# Patient Record
Sex: MALE | Race: OTHER | HISPANIC OR LATINO
[De-identification: names, ages, dates, MRNs, and addresses within clinical notes are randomized per-mention and may not be internally consistent; named-entity substitution may affect disease eponyms.]

---

## 2021-09-27 PROBLEM — Z00.00 ENCOUNTER FOR PREVENTIVE HEALTH EXAMINATION: Status: ACTIVE | Noted: 2021-09-27

## 2021-09-30 ENCOUNTER — APPOINTMENT (OUTPATIENT)
Dept: VASCULAR SURGERY | Facility: CLINIC | Age: 41
End: 2021-09-30
Payer: COMMERCIAL

## 2021-09-30 PROCEDURE — 93971 EXTREMITY STUDY: CPT

## 2021-09-30 PROCEDURE — 99203 OFFICE O/P NEW LOW 30 MIN: CPT

## 2021-10-01 NOTE — PHYSICAL EXAM
[Respiratory Effort] : normal respiratory effort [Normal Heart Sounds] : normal heart sounds [2+] : left 2+ [Calm] : calm [Ankle Swelling (On Exam)] : not present [Varicose Veins Of Lower Extremities] : present [Varicose Veins Of The Right Leg] : of the right leg [Ankle Swelling On The Left] : moderate [] : not present [Alert] : alert [Oriented to Person] : oriented to person [Oriented to Place] : oriented to place [Oriented to Time] : oriented to time [de-identified] : well appearing [FreeTextEntry1] : large bulging veins in the posterior right calf.

## 2021-10-01 NOTE — END OF VISIT
[FreeTextEntry3] : I, Dr. Stephany Osborne  have read and attest that all the information, medical decision making and discharge instructions within are true and accurate, I  personally performed the evaluation and management (E/M) services for this new patient.  That E/M includes conducting the initial examination, assessing all conditions, and establishing the plan of care.  Today, my ACP, Yolis Minor PA-C, was here to observe my evaluation and management services for this patient to be followed going forward.\par

## 2021-10-01 NOTE — HISTORY OF PRESENT ILLNESS
[FreeTextEntry1] : 40 year old male without significant PMH presenting to the office for evaluation of his right leg varicose veins. He states they have been bothering him more lately, they feel heavy and achy. He is wearing compression stockings with some mild relief. He also notices some mild ankle swelling that has worsened lately and worsens after he works. 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

## 2021-10-01 NOTE — ASSESSMENT
[FreeTextEntry1] : 40 year old male without significant PMH presenting to the office for evaluation of his right leg varicose veins. He states they have been bothering him more lately, they feel heavy and achy. He is wearing compression stockings with some mild relief. He also notices some mild ankle swelling that has worsened lately and worsens after he works. \par \par Plan:\par - US showing right leg with GSV reflux, he is symptomatic, recommend right leg RFA procedure. Will work on insurance auth and call him when approved.

## 2021-10-13 ENCOUNTER — APPOINTMENT (OUTPATIENT)
Dept: VASCULAR SURGERY | Facility: CLINIC | Age: 41
End: 2021-10-13

## 2021-10-28 ENCOUNTER — APPOINTMENT (OUTPATIENT)
Dept: VASCULAR SURGERY | Facility: CLINIC | Age: 41
End: 2021-10-28

## 2022-01-12 PROBLEM — I83.891 SYMPTOMATIC VARICOSE VEINS OF RIGHT LOWER EXTREMITY: Status: ACTIVE | Noted: 2021-10-01

## 2022-01-13 ENCOUNTER — APPOINTMENT (OUTPATIENT)
Dept: VASCULAR SURGERY | Facility: CLINIC | Age: 42
End: 2022-01-13
Payer: COMMERCIAL

## 2022-01-13 DIAGNOSIS — I83.891 VARICOSE VEINS OF RIGHT LOWER EXTREMITY WITH OTHER COMPLICATIONS: ICD-10-CM

## 2022-01-13 PROCEDURE — 36475 ENDOVENOUS RF 1ST VEIN: CPT | Mod: RT

## 2022-01-18 ENCOUNTER — APPOINTMENT (OUTPATIENT)
Dept: VASCULAR SURGERY | Facility: CLINIC | Age: 42
End: 2022-01-18
Payer: COMMERCIAL

## 2022-01-18 PROCEDURE — 93971 EXTREMITY STUDY: CPT

## 2022-01-18 NOTE — PROCEDURE
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  \par \par DESCRIPTION OF PROCEDURE:  The patient was taken to the procedure room and placed on the procedure table in the supine position.  Patient identification and site location were confirmed.  Informed consent was obtained and a surgical time out was performed.  After the patient was prepped and draped in the usual sterile fashion, and placed in reverse-Trendelenberg position, local anesthesia was instilled in the skin overlying the access site.  An incision was made overlying the identified entry site with an 11-blade scalpel.  The vein was accessed using ultrasound guidance and with a micropuncture needle, a guide wire was introduced through the needle, which was then exchanged over the wire for a 7 [place] sheath.  The radiofrqeuency catheter was placed into the vein through the sheath just inferior to the superficial epigastric vein to preserve normal physiologic flow in that vein, which was confirmed via ultrasound.  The catheter tip was placed a minimum of 3 cm distal to the saphenofemoral junction.  Tumescent anesthesia was again confirmed with ultrasound imaging, and under direct external compression along the length of the heating element, radiofrequency energy was applied.  The vein was segmentally ablated by heating a 7 cm segment and then indexing the catheter out by 3.5 cm until the treatment length was completed.  \par \par Repeat ultrasound of the saphenous vein was performed, confirming successful treatment.  Catheter and sheath were withdrawn.  The skin incision over the puncture site was closed with a steri-strip and a compression wrap was applied from the level of the foot to the most proximal level of the treated segment.  The patient was assisted off the procedure table.  \par \par Patient will return in 2-3 days for repeat ultrasound to make sure GSV is closed and to check for heat induced thrombus.

## 2022-01-18 NOTE — END OF VISIT
[FreeTextEntry3] : I, Dr. Stephany Osborne  have read and attest that all the information, medical decision making and discharge instructions within are true and accurate. I personally performed the procedure for this established patient who presents today. That E/M includes conducting the examination, assessing all conditions, and performing this procedure myself. Today, my ACP, Yolis Minor PA-C, was here to observe the procedure.

## 2022-02-24 ENCOUNTER — APPOINTMENT (OUTPATIENT)
Dept: VASCULAR SURGERY | Facility: CLINIC | Age: 42
End: 2022-02-24
Payer: COMMERCIAL

## 2022-02-24 PROCEDURE — 99213 OFFICE O/P EST LOW 20 MIN: CPT

## 2022-02-26 NOTE — PHYSICAL EXAM
[2+] : right 2+ [No Rash or Lesion] : No rash or lesion [Calm] : calm [de-identified] : pleasant [de-identified] : +FROM 5/5x4 [de-identified] : RLE varicose veins are no longer visible, NT [de-identified] : grossly intact

## 2022-02-26 NOTE — HISTORY OF PRESENT ILLNESS
[FreeTextEntry1] : 42 yo F with history of varicose veins that became symptomatic and she was noted to have R GSV reflux, now s/p R GSV RFA on 1/13/22 returns for a follow up. Patient is doing well, denies pain, edema. He is happy with the results of the procedure and states that his varicose veins became less bulging and don't cause any discomfort.

## 2022-02-26 NOTE — ADDENDUM
[FreeTextEntry1] : I, Dr. Stephany Osborne, personally performed the evaluation and management (E/M) services for this established patient who presents today with (an) existing condition(s).  That E/M includes conducting the examination, assessing all conditions, and (re)establishing/reinforcing a plan of care.  Today, my ACP, Elyse CONNORS, was here to observe my evaluation and management services for this condition to be followed going forward.\par \par \par

## 2023-06-08 ENCOUNTER — OUTPATIENT (OUTPATIENT)
Dept: OUTPATIENT SERVICES | Facility: HOSPITAL | Age: 43
LOS: 1 days | End: 2023-06-08
Payer: COMMERCIAL

## 2023-06-08 VITALS
OXYGEN SATURATION: 98 % | DIASTOLIC BLOOD PRESSURE: 86 MMHG | HEIGHT: 73 IN | HEART RATE: 70 BPM | WEIGHT: 212.97 LBS | SYSTOLIC BLOOD PRESSURE: 125 MMHG | TEMPERATURE: 99 F | RESPIRATION RATE: 18 BRPM

## 2023-06-08 DIAGNOSIS — G56.01 CARPAL TUNNEL SYNDROME, RIGHT UPPER LIMB: ICD-10-CM

## 2023-06-08 DIAGNOSIS — Z01.818 ENCOUNTER FOR OTHER PREPROCEDURAL EXAMINATION: ICD-10-CM

## 2023-06-08 DIAGNOSIS — Z86.69 PERSONAL HISTORY OF OTHER DISEASES OF THE NERVOUS SYSTEM AND SENSE ORGANS: ICD-10-CM

## 2023-06-08 LAB
ANION GAP SERPL CALC-SCNC: 6 MMOL/L — SIGNIFICANT CHANGE UP (ref 5–17)
BUN SERPL-MCNC: 12 MG/DL — SIGNIFICANT CHANGE UP (ref 7–23)
CALCIUM SERPL-MCNC: 9.6 MG/DL — SIGNIFICANT CHANGE UP (ref 8.5–10.1)
CHLORIDE SERPL-SCNC: 107 MMOL/L — SIGNIFICANT CHANGE UP (ref 96–108)
CO2 SERPL-SCNC: 25 MMOL/L — SIGNIFICANT CHANGE UP (ref 22–31)
CREAT SERPL-MCNC: 0.6 MG/DL — SIGNIFICANT CHANGE UP (ref 0.5–1.3)
EGFR: 123 ML/MIN/1.73M2 — SIGNIFICANT CHANGE UP
GLUCOSE SERPL-MCNC: 91 MG/DL — SIGNIFICANT CHANGE UP (ref 70–99)
HCT VFR BLD CALC: 48.2 % — SIGNIFICANT CHANGE UP (ref 39–50)
HGB BLD-MCNC: 16.4 G/DL — SIGNIFICANT CHANGE UP (ref 13–17)
MCHC RBC-ENTMCNC: 30.7 PG — SIGNIFICANT CHANGE UP (ref 27–34)
MCHC RBC-ENTMCNC: 34 GM/DL — SIGNIFICANT CHANGE UP (ref 32–36)
MCV RBC AUTO: 90.3 FL — SIGNIFICANT CHANGE UP (ref 80–100)
NRBC # BLD: 0 /100 WBCS — SIGNIFICANT CHANGE UP (ref 0–0)
PLATELET # BLD AUTO: 276 K/UL — SIGNIFICANT CHANGE UP (ref 150–400)
POTASSIUM SERPL-MCNC: 3.9 MMOL/L — SIGNIFICANT CHANGE UP (ref 3.5–5.3)
POTASSIUM SERPL-SCNC: 3.9 MMOL/L — SIGNIFICANT CHANGE UP (ref 3.5–5.3)
RBC # BLD: 5.34 M/UL — SIGNIFICANT CHANGE UP (ref 4.2–5.8)
RBC # FLD: 12.3 % — SIGNIFICANT CHANGE UP (ref 10.3–14.5)
SODIUM SERPL-SCNC: 138 MMOL/L — SIGNIFICANT CHANGE UP (ref 135–145)
WBC # BLD: 7.59 K/UL — SIGNIFICANT CHANGE UP (ref 3.8–10.5)
WBC # FLD AUTO: 7.59 K/UL — SIGNIFICANT CHANGE UP (ref 3.8–10.5)

## 2023-06-08 PROCEDURE — 80048 BASIC METABOLIC PNL TOTAL CA: CPT

## 2023-06-08 PROCEDURE — 85027 COMPLETE CBC AUTOMATED: CPT

## 2023-06-08 PROCEDURE — 36415 COLL VENOUS BLD VENIPUNCTURE: CPT

## 2023-06-08 PROCEDURE — G0463: CPT

## 2023-06-08 NOTE — H&P PST ADULT - PROBLEM SELECTOR PLAN 1
Plan-scheduled for right hand carpal tunnel release  preop instruction provided in writing and verbally, patient verbalized understanding and teach back   cbc and bmp draws at pst, pending result Plan-scheduled for right hand carpal tunnel release  preop instruction provided in writing and verbally, patient verbalized understanding and teach back   advised not to use marijuana x 7 days prior to surgery   cbc and bmp draws at pst, pending result

## 2023-06-08 NOTE — H&P PST ADULT - HISTORY OF PRESENT ILLNESS
42 year old male with no significant PMH/PSH, presents for preop testing for scheduled right hand carpal tunnel release on 6/21/2023.

## 2023-06-08 NOTE — H&P PST ADULT - ASSESSMENT
DASI ACTIVITIES- GOES TO THE GYM, DOES HEAVY LIFTING, WALKING  MALLAMPATI- 2  NO LOOSE OR BROKEN TOOTH

## 2023-06-08 NOTE — H&P PST ADULT - NSANTHOSAYNRD_GEN_A_CORE
No. HOLLI screening performed.  STOP BANG Legend: 0-2 = LOW Risk; 3-4 = INTERMEDIATE Risk; 5-8 = HIGH Risk

## 2023-06-08 NOTE — H&P PST ADULT - NSICDXPROCEDURE_GEN_ALL_CORE_FT
PROCEDURES:  Carpal tunnel surgery 08-Jun-2023 10:50:26 carpal tunnel sundrome right upper limb Christy Silva

## 2023-06-20 ENCOUNTER — TRANSCRIPTION ENCOUNTER (OUTPATIENT)
Age: 43
End: 2023-06-20

## 2023-06-20 NOTE — ASU PATIENT PROFILE, ADULT - ABLE TO REACH PT
no answer. left message to follow up with surgeon if any questions../no no answer. left message to follow up with surgeon if any questions../yes

## 2023-06-20 NOTE — ASU PATIENT PROFILE, ADULT - FALL HARM RISK - UNIVERSAL INTERVENTIONS
Bed in lowest position, wheels locked, appropriate side rails in place/Call bell, personal items and telephone in reach/Instruct patient to call for assistance before getting out of bed or chair/Non-slip footwear when patient is out of bed/[place] to call system/Physically safe environment - no spills, clutter or unnecessary equipment/Purposeful Proactive Rounding/Room/bathroom lighting operational, light cord in reach

## 2023-06-21 ENCOUNTER — TRANSCRIPTION ENCOUNTER (OUTPATIENT)
Age: 43
End: 2023-06-21

## 2023-06-21 ENCOUNTER — OUTPATIENT (OUTPATIENT)
Dept: OUTPATIENT SERVICES | Facility: HOSPITAL | Age: 43
LOS: 1 days | End: 2023-06-21
Payer: COMMERCIAL

## 2023-06-21 VITALS
TEMPERATURE: 98 F | DIASTOLIC BLOOD PRESSURE: 84 MMHG | OXYGEN SATURATION: 98 % | RESPIRATION RATE: 14 BRPM | SYSTOLIC BLOOD PRESSURE: 120 MMHG | HEART RATE: 68 BPM

## 2023-06-21 VITALS
RESPIRATION RATE: 15 BRPM | WEIGHT: 212.97 LBS | HEIGHT: 73 IN | DIASTOLIC BLOOD PRESSURE: 86 MMHG | HEART RATE: 58 BPM | TEMPERATURE: 98 F | SYSTOLIC BLOOD PRESSURE: 145 MMHG | OXYGEN SATURATION: 96 %

## 2023-06-21 DIAGNOSIS — G56.01 CARPAL TUNNEL SYNDROME, RIGHT UPPER LIMB: ICD-10-CM

## 2023-06-21 PROCEDURE — 64721 CARPAL TUNNEL SURGERY: CPT | Mod: RT

## 2023-06-21 PROCEDURE — 64727 INTERNAL NEUROLYSIS: CPT

## 2023-06-21 RX ORDER — CEFAZOLIN SODIUM 1 G
2000 VIAL (EA) INJECTION ONCE
Refills: 0 | Status: COMPLETED | OUTPATIENT
Start: 2023-06-21 | End: 2023-06-21

## 2023-06-21 RX ORDER — ONDANSETRON 8 MG/1
4 TABLET, FILM COATED ORAL ONCE
Refills: 0 | Status: DISCONTINUED | OUTPATIENT
Start: 2023-06-21 | End: 2023-06-21

## 2023-06-21 RX ORDER — OXYCODONE HYDROCHLORIDE 5 MG/1
1 TABLET ORAL
Qty: 18 | Refills: 0
Start: 2023-06-21 | End: 2023-06-23

## 2023-06-21 RX ORDER — HYDROMORPHONE HYDROCHLORIDE 2 MG/ML
0.5 INJECTION INTRAMUSCULAR; INTRAVENOUS; SUBCUTANEOUS
Refills: 0 | Status: DISCONTINUED | OUTPATIENT
Start: 2023-06-21 | End: 2023-06-21

## 2023-06-21 RX ORDER — SODIUM CHLORIDE 9 MG/ML
1000 INJECTION, SOLUTION INTRAVENOUS
Refills: 0 | Status: DISCONTINUED | OUTPATIENT
Start: 2023-06-21 | End: 2023-06-21

## 2023-06-21 RX ORDER — OXYCODONE HYDROCHLORIDE 5 MG/1
5 TABLET ORAL ONCE
Refills: 0 | Status: DISCONTINUED | OUTPATIENT
Start: 2023-06-21 | End: 2023-06-21

## 2023-06-21 NOTE — ASU DISCHARGE PLAN (ADULT/PEDIATRIC) - CARE PROVIDER_API CALL
Daron Mark  Orthopaedic Surgery  [address] [place] [address]  Phone: [phone]  Fax: [phone]  Follow Up Time:

## 2023-06-21 NOTE — BRIEF OPERATIVE NOTE - PRIMARY SURGEON
Shruti,    Will you address this or should I send back to [facility] so she can see it tomorrow?  It doesn't seem to be new symptoms and more questions she wants answered.    Thank you,  Noni Mosqueda RN  [facility]  Triage     Jas

## 2023-06-21 NOTE — ASU DISCHARGE PLAN (ADULT/PEDIATRIC) - DISCHARGE PLAN IS COMPLETE AND GIVEN TO PATIENT
Left message for patient to call back to providers office at [place]- [phone]    CCR - Please Raúl/Tunde TALAVERA_URMILA Elise/Myriam/Melissa Non-Triage    Please let patient know that her nicotine patch per patients insurance do no require a prior authorization.    : Yes

## 2023-06-21 NOTE — ASU DISCHARGE PLAN (ADULT/PEDIATRIC) - NS MD DC FALL RISK RISK
For information on Fall & Injury Prevention, visit: [email or web address] OR  [email or web address] OR  https://www.cdc.gov/steadi/patient.html

## 2023-06-21 NOTE — ASU DISCHARGE PLAN (ADULT/PEDIATRIC) - ASU DC SPECIAL INSTRUCTIONSFT
Diet: You may resume your usual diet. Avoid alcohol and excessive salt intake for two weeks following surgery. This will help to minimize swelling.    Medication: Pain medication has also been sent to your pharmacy for any moderate to severe pain you may experience. Alternatively, you may take Tylenol for mild discomfort.    Activity: Keep your hand elevated as much as possible to reduce swelling. You may take care of your personal needs as desired, however, no lifting or strenuous activity is allowed for 4 weeks following surgery. Driving is not permitted for at least two weeks.    Wound care: Keep your dressing clean, dry, and intact until seen by MD/PA. Do not smoke! It delays wound healing and increases the risk of complications.    Personal Hygiene: Do not get the operative area wet. You are allowed to sponge bathe. No tub soaking. You may shower provided the hand is wrapped with a plastic bag.    Things to expect: The operative area may be bruised, swollen, and painful. You may also have temporary numbness and stiffness which will improve over time. Hand therapy will be helpful to obtain maximum function and full range of motion. It will be started after the first or second post-operative visit.    In case of emergency: call the office any time day or night. Post-operative care will be provided in the office one week following surgery. If you do not already have an appointment, please call during regular office hours to schedule.     We wish you a pleasant recovery.

## (undated) DEVICE — DRSG CAST PLASTER XFAST 3"

## (undated) DEVICE — GLV 8.5 PROTEXIS ORTHO (CREAM)

## (undated) DEVICE — SLING ARM CHIEFTAIN MESH LARGE

## (undated) DEVICE — PLV/PSP-TOURNIQUET #2 3006KAAL: Type: DURABLE MEDICAL EQUIPMENT

## (undated) DEVICE — SLING ARM CHIEFTAIN MESH MEDIUM

## (undated) DEVICE — PLV/PSP-ESU FORCEFX F8J7721A: Type: DURABLE MEDICAL EQUIPMENT

## (undated) DEVICE — GLV 8.5 PROTEXIS (WHITE)

## (undated) DEVICE — PACK HAND

## (undated) DEVICE — SOL IRR POUR NS 0.9% 1000ML

## (undated) DEVICE — VENODYNE/SCD SLEEVE CALF LARGE

## (undated) DEVICE — SOL IRR POUR H2O 1000ML

## (undated) DEVICE — DRAPE 3/4 SHEET W REINFORCEMENT 56X77"

## (undated) DEVICE — IMMOBILIZER HAND ALUMINUM XL

## (undated) DEVICE — SUT MONOSOF 4-0 18" P-12

## (undated) DEVICE — TOURNIQUET CUFF 18" DUAL PORT SINGLE BLADDER LUER LOCK (BLACK)

## (undated) DEVICE — DRAPE SNAP KOVER 36X28

## (undated) DEVICE — WARMING BLANKET LOWER ADULT

## (undated) DEVICE — DRAPE LIGHT HANDLE COVER (GREEN)

## (undated) DEVICE — ELCTR BIPOLAR CORD J&J 12FT DISP

## (undated) DEVICE — VENODYNE/SCD SLEEVE CALF MEDIUM

## (undated) DEVICE — DRAPE TOWEL BLUE 17" X 24"

## (undated) DEVICE — DRSG COBAN 4"

## (undated) DEVICE — GOWN XL EXTRA LONG

## (undated) DEVICE — PLV-SCD MACHINE: Type: DURABLE MEDICAL EQUIPMENT

## (undated) DEVICE — DRSG XEROFORM 1 X 8"

## (undated) DEVICE — DRSG KERLIX ROLL 4.5"

## (undated) DEVICE — DRSG CAST PLASTER XFAST 4"